# Patient Record
Sex: FEMALE | Race: OTHER | HISPANIC OR LATINO | Employment: OTHER | URBAN - METROPOLITAN AREA
[De-identification: names, ages, dates, MRNs, and addresses within clinical notes are randomized per-mention and may not be internally consistent; named-entity substitution may affect disease eponyms.]

---

## 2023-01-01 ENCOUNTER — HOSPITAL ENCOUNTER (EMERGENCY)
Facility: HOSPITAL | Age: 78
End: 2023-06-18
Attending: EMERGENCY MEDICINE
Payer: MEDICARE

## 2023-01-01 DIAGNOSIS — R41.89 UNRESPONSIVE: ICD-10-CM

## 2023-01-01 DIAGNOSIS — I46.9 PULSELESS ELECTRICAL ACTIVITY (HCC): Primary | ICD-10-CM

## 2023-01-01 LAB
BASE EXCESS BLDA CALC-SCNC: -2 MMOL/L (ref -2–3)
BASE EXCESS BLDA CALC-SCNC: 1 MMOL/L (ref -2–3)
CA-I BLD-SCNC: 0.99 MMOL/L (ref 1.12–1.32)
CA-I BLD-SCNC: >2.2 MMOL/L (ref 1.12–1.32)
GLUCOSE SERPL-MCNC: 589 MG/DL (ref 65–140)
GLUCOSE SERPL-MCNC: 94 MG/DL (ref 65–140)
HCO3 BLDA-SCNC: 26.4 MMOL/L (ref 24–30)
HCO3 BLDA-SCNC: 28.5 MMOL/L (ref 24–30)
HCT VFR BLD CALC: 37 % (ref 34.8–46.1)
HCT VFR BLD CALC: 38 % (ref 34.8–46.1)
HGB BLDA-MCNC: 12.6 G/DL (ref 11.5–15.4)
HGB BLDA-MCNC: 12.9 G/DL (ref 11.5–15.4)
PCO2 BLD: 28 MMOL/L (ref 21–32)
PCO2 BLD: 30 MMOL/L (ref 21–32)
PCO2 BLD: 56.3 MM HG (ref 42–50)
PCO2 BLD: 58.8 MM HG (ref 42–50)
PH BLD: 7.26 [PH] (ref 7.3–7.4)
PH BLD: 7.31 [PH] (ref 7.3–7.4)
PO2 BLD: 25 MM HG (ref 35–45)
PO2 BLD: 316 MM HG (ref 35–45)
POTASSIUM BLD-SCNC: 3.5 MMOL/L (ref 3.5–5.3)
POTASSIUM BLD-SCNC: 6.9 MMOL/L (ref 3.5–5.3)
SAO2 % BLD FROM PO2: 100 % (ref 60–85)
SAO2 % BLD FROM PO2: 40 % (ref 60–85)
SODIUM BLD-SCNC: 143 MMOL/L (ref 136–145)
SODIUM BLD-SCNC: 147 MMOL/L (ref 136–145)
SPECIMEN SOURCE: ABNORMAL
SPECIMEN SOURCE: ABNORMAL

## 2023-01-01 PROCEDURE — 36556 INSERT NON-TUNNEL CV CATH: CPT | Performed by: EMERGENCY MEDICINE

## 2023-01-01 PROCEDURE — 82947 ASSAY GLUCOSE BLOOD QUANT: CPT

## 2023-01-01 PROCEDURE — 92950 HEART/LUNG RESUSCITATION CPR: CPT

## 2023-01-01 PROCEDURE — 85014 HEMATOCRIT: CPT

## 2023-01-01 PROCEDURE — 84132 ASSAY OF SERUM POTASSIUM: CPT

## 2023-01-01 PROCEDURE — 84295 ASSAY OF SERUM SODIUM: CPT

## 2023-01-01 PROCEDURE — 99291 CRITICAL CARE FIRST HOUR: CPT | Performed by: EMERGENCY MEDICINE

## 2023-01-01 PROCEDURE — 96375 TX/PRO/DX INJ NEW DRUG ADDON: CPT

## 2023-01-01 PROCEDURE — 96374 THER/PROPH/DIAG INJ IV PUSH: CPT

## 2023-01-01 PROCEDURE — 82330 ASSAY OF CALCIUM: CPT

## 2023-01-01 PROCEDURE — 82803 BLOOD GASES ANY COMBINATION: CPT

## 2023-01-01 PROCEDURE — 92950 HEART/LUNG RESUSCITATION CPR: CPT | Performed by: EMERGENCY MEDICINE

## 2023-01-01 PROCEDURE — 99284 EMERGENCY DEPT VISIT MOD MDM: CPT

## 2023-01-01 RX ORDER — SODIUM BICARBONATE 84 MG/ML
INJECTION, SOLUTION INTRAVENOUS CODE/TRAUMA/SEDATION MEDICATION
Status: COMPLETED | OUTPATIENT
Start: 2023-01-01 | End: 2023-01-01

## 2023-01-01 RX ORDER — CALCIUM CHLORIDE 100 MG/ML
SYRINGE (ML) INTRAVENOUS CODE/TRAUMA/SEDATION MEDICATION
Status: COMPLETED | OUTPATIENT
Start: 2023-01-01 | End: 2023-01-01

## 2023-01-01 RX ORDER — EPINEPHRINE IN SOD CHLOR,ISO 1 MG/10 ML
SYRINGE (ML) INTRAVENOUS CODE/TRAUMA/SEDATION MEDICATION
Status: COMPLETED | OUTPATIENT
Start: 2023-01-01 | End: 2023-01-01

## 2023-01-01 RX ORDER — EPINEPHRINE 0.1 MG/ML
5 SYRINGE (ML) INJECTION ONCE
Status: COMPLETED | OUTPATIENT
Start: 2023-01-01 | End: 2023-01-01

## 2023-01-01 RX ORDER — EPINEPHRINE 1 MG/ML
5 INJECTION, SOLUTION, CONCENTRATE INTRAVENOUS ONCE
Status: DISCONTINUED | OUTPATIENT
Start: 2023-01-01 | End: 2023-01-01

## 2023-01-01 RX ORDER — EPINEPHRINE IN SOD CHLOR,ISO 1 MG/10 ML
SYRINGE (ML) INTRAVENOUS
Status: DISCONTINUED
Start: 2023-01-01 | End: 2023-01-01 | Stop reason: HOSPADM

## 2023-01-01 RX ADMIN — EPINEPHRINE 1 APPLICATION: 0.1 INJECTION INTRAVENOUS at 00:36

## 2023-01-01 RX ADMIN — SODIUM BICARBONATE 50 MEQ: 84 INJECTION, SOLUTION INTRAVENOUS at 00:26

## 2023-01-01 RX ADMIN — EPINEPHRINE 1 APPLICATION: 0.1 INJECTION INTRAVENOUS at 00:33

## 2023-01-01 RX ADMIN — EPINEPHRINE 1 APPLICATION: 0.1 INJECTION INTRAVENOUS at 00:30

## 2023-01-01 RX ADMIN — CALCIUM CHLORIDE 1 G: 100 INJECTION PARENTERAL at 00:30

## 2023-01-01 RX ADMIN — EPINEPHRINE 1 APPLICATION: 0.1 INJECTION INTRAVENOUS at 00:26

## 2023-06-18 NOTE — PROGRESS NOTES
Pastoral Care Progress Note    2023  Patient: Blanca Wolf : 1945  Admission Date & Time: 2023 0022  MRN: [id number] [facility]: [id number]                     Chaplaincy Interventions Utilized:     Exploration: Facilitated life review and Facilitated story telling    Relationship Building: Cultivated a relationship of care and support, Listened empathically, and Hospitality     provided spiritual and emotional support with family and staff      Chaplaincy Outcomes Achieved:  Expressed gratitude       23 0030   Clinical Encounter Type   Visited With Family   Crisis Visit Code  (Cardiac Arrest)   Referral From Physician

## 2023-06-18 NOTE — ED PROCEDURE NOTE
Procedure  Central Line    Date/Time: 6/18/2023 1:08 AM    Performed by: Isaac Freitas DO  Authorized by: Isaac Freitas DO    Patient location:  ED  Other Assisting Provider: No    Consent:     Consent obtained:  Emergent situation  Universal protocol:     Patient identity confirmed:  Arm band  Pre-procedure details:     Hand hygiene: Hand hygiene performed prior to insertion      Skin preparation:  ChloraPrep    Skin preparation agent: Skin preparation agent completely dried prior to procedure    Indications:     Central line indications: medications requiring central line, hemodynamic monitoring and no peripheral vascular access    Anesthesia (see MAR for exact dosages): Anesthesia method:  None  Procedure details:     Location:  Left femoral    Vessel type: vein      Laterality:  Left    Approach: percutaneous technique used      Patient position:  Flat    Catheter type:  Triple lumen    Catheter size:  7 Fr    Landmarks identified: yes      Ultrasound guidance: no      Manometry confirmation: no      Number of attempts:  1    Successful placement: yes    Post-procedure details:     Post-procedure:  Dressing applied    Assessment:  Blood return through all ports    Patient tolerance of procedure:   Tolerated well, no immediate complications                     Isaac Freitas DO  06/18/23 0489

## 2023-06-18 NOTE — ED PROVIDER NOTES
History  Chief Complaint   Patient presents with   • Cardiac Arrest     Pt brought in by EMS from home pt was doing dishes and went unresponsive  68 yo F no reported PMHx per family, presents to the ED via EMS unresponsive and with active CPR in progress  According to family, patient was washing the dishes when she suddenly collapsed  Family states that she was in her normal state of health today, she went to a baby shower and was dancing there  Patient was recently complaining of some breathing difficulty but never of chest pain  She has not seen a doctor for any reason in probably 40 years as far as the family knows  Takes no medication on a daily basis  On EMS arrival, patient was first found with some agonal breathing and was intubated  She was found to be in pulseless electrical activity and received 3 doses of epinephrine en route to the hospital            None       No past medical history on file  No past surgical history on file  No family history on file  I have reviewed and agree with the history as documented  E-Cigarette/Vaping     E-Cigarette/Vaping Substances     Social History     Tobacco Use   • Smoking status: Unknown        Review of Systems   Unable to perform ROS: Acuity of condition       Physical Exam  ED Triage Vitals   Temp Pulse Resp BP SpO2   -- -- -- -- --      Temp src Heart Rate Source Patient Position - Orthostatic VS BP Location FiO2 (%)   -- -- -- -- --      Pain Score       --             Orthostatic Vital Signs  There were no vitals filed for this visit  Physical Exam  Constitutional:       Comments: unresponsive    HENT:      Head: Normocephalic and atraumatic  Comments: 4+ pupils, unreactive b/l     Nose: Nose normal       Mouth/Throat:      Mouth: Mucous membranes are moist       Pharynx: Oropharynx is clear     Cardiovascular:      Comments: No audible cardiac activity on ausculation  No palpable pulses (femoral or carotid)  Chest:      Comments: Equal B/l breath sounds  Abdominal:      Palpations: Abdomen is soft  Genitourinary:     General: Normal vulva  Musculoskeletal:         General: No swelling  Right lower leg: No edema  Left lower leg: No edema  Skin:     General: Skin is warm  Capillary Refill: Capillary refill takes 2 to 3 seconds  Comments: Central line placed in L groin   Neurological:      Mental Status: She is unresponsive  GCS: GCS eye subscore is 1  GCS verbal subscore is 1  GCS motor subscore is 1           ED Medications  Medications   EPINEPHrine (ADRENALIN) injection ( Intravenous Canceled Entry 6/18/23 0045)   sodium bicarbonate 50 mEq/50 mL injection (50 mEq Intravenous Given 6/18/23 0026)   calcium chloride 1 g/10 mL injection (1 g Intravenous Given 6/18/23 0030)   EPINEPHrine (FOR EMS ONLY) (ADRENALIN) injection 5 mg (0 mg Does not apply Given to EMS 6/18/23 0042)       Diagnostic Studies  Results Reviewed     Procedure Component Value Units Date/Time    POCT Blood Gas (CG8+) [[id number]]  (Abnormal) Collected: 06/18/23 0043    Lab Status: Final result Specimen: Venous Updated: 06/18/23 0047     ph, Howard ISTAT 7 260     pCO2, Howard i-STAT 58 8 mm HG      pO2, Howard i-STAT 316 0 mm HG      BE, i-STAT -2 mmol/L      HCO3, Howard i-STAT 26 4 mmol/L      CO2, i-STAT 28 mmol/L      O2 Sat, i-STAT 100 %      SODIUM, I-STAT 147 mmol/l      Potassium, i-STAT 6 9 mmol/L      Calcium, Ionized i-STAT >2 20 mmol/L      Hct, i-STAT 38 %      Hgb, i-STAT 12 9 g/dl      Glucose, i-STAT 589 mg/dl      Specimen Type VENOUS    POCT Blood Gas (CG8+) [[id number]]  (Abnormal) Collected: 06/18/23 0035    Lab Status: Final result Specimen: Venous Updated: 06/18/23 0047     ph, Howard ISTAT 7 312     pCO2, Howard i-STAT 56 3 mm HG      pO2, Howard i-STAT 25 0 mm HG      BE, i-STAT 1 mmol/L      HCO3, Howard i-STAT 28 5 mmol/L      CO2, i-STAT 30 mmol/L      O2 Sat, i-STAT 40 %      SODIUM, I-STAT 143 mmol/l      Potassium, i-STAT 3 5 mmol/L      Calcium, Ionized i-STAT 0 99 mmol/L      Hct, i-STAT 37 %      Hgb, i-STAT 12 6 g/dl      Glucose, i-STAT 94 mg/dl      Specimen Type VENOUS                 No orders to display         Procedures  Procedures      ED Course                                       Medical Decision Making  68 yo F no reported PMHx per family, presents to the ED via EMS unresponsive and with active CPR in progress  DDx: probable cardiac arrest (probable MI)  Upon arrival, patient video laryngoscopy was used to confirm ET tube proper placement, equal lung sounds and chest rise bilaterally  Patient received 5 total doses of epinephrine, 1 dose of calcium chloride and 1 amp of bicarb  I-STAT obtained with normal pH, otherwise fairly normal electrolytes  Throughout all pulse checks, patient remained without a pulse and cardiac monitor continued to show pulseless electrical activity  POC bedside echo performed without visible cardiac movement/squeeze  Following all of the above, with rather normal I-stat labs aside from noted hyperglycemia, 5 rounds of epinephrine/CPR and PEA on the monitor, it was decided to cease CPR and time of death was called at 00:39 2023  Risk  Prescription drug management  Disposition  Final diagnoses:   Pulseless electrical activity ([address] )   Unresponsive     Time reflects when diagnosis was documented in both MDM as applicable and the Disposition within this note     Time User Action Codes Description Comment    2023  1:42 AM Evelene Romberg Add [I46 9] Pulseless electrical activity ([address] )     2023  1:42 AM Evelene Romberg Add [R41 89] Unresponsive       ED Disposition     ED Disposition       Condition   --    Date/Time   Sun 2023 12:53 AM    Comment   --         Follow-up Information    None         Patient's Medications    No medications on file     No discharge procedures on file      PDMP Review     None           ED Provider  Attending physically available and kacy Wolf I managed the patient along with the ED Attending      Electronically Signed by         Melissa Tabares MD  06/18/23 0443

## 2023-07-03 NOTE — ED ATTENDING ATTESTATION
6/18/2023  ILorelei MD, saw and evaluated the patient  I have discussed the patient with the resident/non-physician practitioner and agree with the resident's/non-physician practitioner's findings, Plan of Care, and MDM as documented in the resident's/non-physician practitioner's note, except where noted  All available labs and Radiology studies were reviewed  I was present for key portions of any procedure(s) performed by the resident/non-physician practitioner and I was immediately available to provide assistance  At this point I agree with the current assessment done in the Emergency Department  I have conducted an independent evaluation of this patient a history and physical is as follows:    ED Course     Patient presents for evaluation after collapsing at home while washing the dishes  On EMS arrival, patient was in PEA and CPR with started, patient was intubated, and she was brought to the hospital  En route, patient remained in PEA despite multiple rounds of CPR  No additional history available  On arrival, patient was pulseless and in PEA on the monitor  ACLS protocol was continued but patient had no ROSC  An istat was ordered which did not show any correctable cause of cardiac arrest and the code was called at 0039  Family was updated  Family states that patient was in her usual state of health during the day  Also, they report that she has not seen a physician in 40 years but that her breathing has been worsening  Please refer to code record for additional details  Critical Care Time  CriticalCare Time    Date/Time: 6/18/2023 12:39 AM    Performed by: Lorelei Morales MD  Authorized by:  Lorelei Morales MD    Critical care provider statement:     Critical care time (minutes):  32    Critical care time was exclusive of:  Separately billable procedures and treating other patients and teaching time    Critical care was necessary to treat or prevent imminent or life-threatening deterioration of the following conditions:  Cardiac failure, circulatory failure and CNS failure or compromise    Critical care was time spent personally by me on the following activities:  Blood draw for specimens, obtaining history from patient or surrogate, evaluation of patient's response to treatment, examination of patient, ordering and performing treatments and interventions, ordering and review of laboratory studies and re-evaluation of patient's condition    I assumed direction of critical care for this patient from another provider in my specialty: no